# Patient Record
(demographics unavailable — no encounter records)

---

## 2024-10-27 NOTE — REVIEW OF SYSTEMS
[Cold Intolerance] : cold intolerance [Fatigue] : no fatigue [Decreased Appetite] : appetite not decreased [Recent Weight Gain (___ Lbs)] : no recent weight gain [Recent Weight Loss (___ Lbs)] : no recent weight loss [Fever] : no fever [Chills] : no chills [Dry Eyes] : no dryness [Blurred Vision] : no blurred vision [Eyes Itch] : no itch [Dysphagia] : no dysphagia [Hearing Loss] : no hearing loss  [Chest Pain] : no chest pain [Leg Claudication] : no leg claudication [Shortness Of Breath] : no shortness of breath [Cough] : no cough [Wheezing] : no wheezing [Nausea] : no nausea [Constipation] : no constipation [Heartburn] : no heartburn [Diarrhea] : no diarrhea [Polyuria] : no polyuria [Dysuria] : no dysuria [Nocturia] : no nocturia [Joint Pain] : no joint pain [Muscle Weakness] : no muscle weakness [Myalgia] : no myalgia  [Joint Stiffness] : no joint stiffness [Dry Skin] : no dry skin [Hair Loss] : no hair loss [Ulcer] : no ulcer [Headaches] : no headaches [Difficulty Walking] : no difficulty walking [Tremors] : no tremors [Pain/Numbness of Digits] : no pain/numbness of digits [Insomnia] : no insomnia [Anxiety] : no anxiety [Stress] : no stress [Polydipsia] : no polydipsia [Heat Intolerance] : no heat intolerance [Easy Bleeding] : no ~M tendency for easy bleeding [Easy Bruising] : no tendency for easy bruising [FreeTextEntry3] : See comments in the HPI [FreeTextEntry5] : Occasional edema at the end of the day.  Palpitations if she climbs stairs [FreeTextEntry6] : MOHAN only with stairs [FreeTextEntry9] : Mild pain in both knees, only with ambulation [de-identified] : Mild dysphoria related to her sister's death

## 2024-10-27 NOTE — REVIEW OF SYSTEMS
[Cold Intolerance] : cold intolerance [Fatigue] : no fatigue [Decreased Appetite] : appetite not decreased [Recent Weight Gain (___ Lbs)] : no recent weight gain [Recent Weight Loss (___ Lbs)] : no recent weight loss [Fever] : no fever [Chills] : no chills [Dry Eyes] : no dryness [Blurred Vision] : no blurred vision [Eyes Itch] : no itch [Dysphagia] : no dysphagia [Hearing Loss] : no hearing loss  [Chest Pain] : no chest pain [Leg Claudication] : no leg claudication [Shortness Of Breath] : no shortness of breath [Cough] : no cough [Wheezing] : no wheezing [Nausea] : no nausea [Constipation] : no constipation [Heartburn] : no heartburn [Diarrhea] : no diarrhea [Polyuria] : no polyuria [Dysuria] : no dysuria [Nocturia] : no nocturia [Joint Pain] : no joint pain [Muscle Weakness] : no muscle weakness [Myalgia] : no myalgia  [Joint Stiffness] : no joint stiffness [Dry Skin] : no dry skin [Hair Loss] : no hair loss [Ulcer] : no ulcer [Headaches] : no headaches [Difficulty Walking] : no difficulty walking [Tremors] : no tremors [Pain/Numbness of Digits] : no pain/numbness of digits [Insomnia] : no insomnia [Anxiety] : no anxiety [Stress] : no stress [Polydipsia] : no polydipsia [Heat Intolerance] : no heat intolerance [Easy Bleeding] : no ~M tendency for easy bleeding [Easy Bruising] : no tendency for easy bruising [FreeTextEntry3] : See comments in the HPI [FreeTextEntry5] : Occasional edema at the end of the day.  Palpitations if she climbs stairs [FreeTextEntry6] : MOHAN only with stairs [FreeTextEntry9] : Mild pain in both knees, only with ambulation [de-identified] : Mild dysphoria related to her sister's death

## 2024-10-27 NOTE — PHYSICAL EXAM
[Alert] : alert [Healthy Appearance] : healthy appearance [EOMI] : extra ocular movement intact [No Proptosis] : no proptosis [No Lid Lag] : no lid lag [Normal Outer Ear/Nose] : the ears and nose were normal in appearance [Normal Hearing] : hearing was normal [No Neck Mass] : no neck mass was observed [No LAD] : no lymphadenopathy [Clear to Auscultation] : lungs were clear to auscultation bilaterally [Normal Rate] : heart rate was normal [Regular Rhythm] : with a regular rhythm [Carotids Normal] : carotid pulses were normal with no bruits [Not Tender] : non-tender [Soft] : abdomen soft [No HSM] : no hepato-splenomegaly [Normal Supraclavicular Nodes] : no supraclavicular lymphadenopathy [Normal Anterior Cervical Nodes] : no anterior cervical lymphadenopathy [No CVA Tenderness] : no ~M costovertebral angle tenderness [Normal Gait] : normal gait [No Involuntary Movements] : no involuntary movements were seen [No Joint Swelling] : no joint swelling seen [Normal Strength/Tone] : muscle strength and tone were normal [No Rash] : no rash [Swelling] : swollen [Normal] : normal [0] : 0 in the posterior tibialis [2+] : 2+ in the dorsalis pedis [Vibration Dec.] : diminished vibratory sensation at the level of the toes [No Tremors] : no tremors [Normal Sensation on Monofilament Testing] : normal sensation on monofilament testing of lower extremities [Normal Affect] : the affect was normal [Normal Mood] : the mood was normal [No Murmurs] : no murmurs [Kyphosis] : no kyphosis present [Abdominal Striae] : no abdominal striae [Foot Ulcers] : no foot ulcers [Delayed in the Right Toes] : normal in the toes [Tenderness] : not tender [Erythema] : not erythematous [Delayed in the Left Toes] : normal in the toes [Position Sense Dec.] : normal position sense at the level of the toes [Diminished Throughout Both Feet] : normal tactile sensation with monofilament testing throughout both feet [de-identified] : Borderline obese [de-identified] : Opacified left cornea.  R pupil miotic.  Faint corneal arcus OD [de-identified] : Thyroid 30-40 grams, multinodular in consistency, with the most distinct nodule 3 cm in the R lobe  (Pt continues to refuse ultrasound) [de-identified] : 2+ non-pitting ankle edema bilaterally.  DP pulses 2+ bilaterally, PTs not palpable. [de-identified] : Areas of mild lipohypertrophy on either side of the umbilicus.  (Says that she has been avoiding injecting into these areas--now using other areas of the abdomen or her legs) [de-identified] : Faint acanthosis in the posterior cervical area [FreeTextEntry1] : Moderate ankle edema [FreeTextEntry3] : Ankel edema precludes optimal assessment of the PT pulses [FreeTextEntry5] : Moderate ankle edema [FreeTextEntry7] : Ankle edema interferes with optimal assessment of the PT pulse [de-identified] : Vibratory sensation moderately decreased over the toes [de-identified] : Was very "subdued" in demeanor, suspect that she was still dealing with her sister's recent death

## 2024-10-27 NOTE — PHYSICAL EXAM
[Alert] : alert [Healthy Appearance] : healthy appearance [EOMI] : extra ocular movement intact [No Proptosis] : no proptosis [No Lid Lag] : no lid lag [Normal Outer Ear/Nose] : the ears and nose were normal in appearance [Normal Hearing] : hearing was normal [No Neck Mass] : no neck mass was observed [No LAD] : no lymphadenopathy [Clear to Auscultation] : lungs were clear to auscultation bilaterally [Normal Rate] : heart rate was normal [Regular Rhythm] : with a regular rhythm [Carotids Normal] : carotid pulses were normal with no bruits [Not Tender] : non-tender [Soft] : abdomen soft [No HSM] : no hepato-splenomegaly [Normal Supraclavicular Nodes] : no supraclavicular lymphadenopathy [Normal Anterior Cervical Nodes] : no anterior cervical lymphadenopathy [No CVA Tenderness] : no ~M costovertebral angle tenderness [Normal Gait] : normal gait [No Involuntary Movements] : no involuntary movements were seen [No Joint Swelling] : no joint swelling seen [Normal Strength/Tone] : muscle strength and tone were normal [No Rash] : no rash [Swelling] : swollen [Normal] : normal [0] : 0 in the posterior tibialis [2+] : 2+ in the dorsalis pedis [Vibration Dec.] : diminished vibratory sensation at the level of the toes [No Tremors] : no tremors [Normal Sensation on Monofilament Testing] : normal sensation on monofilament testing of lower extremities [Normal Affect] : the affect was normal [Normal Mood] : the mood was normal [No Murmurs] : no murmurs [Kyphosis] : no kyphosis present [Abdominal Striae] : no abdominal striae [Foot Ulcers] : no foot ulcers [Delayed in the Right Toes] : normal in the toes [Tenderness] : not tender [Erythema] : not erythematous [Delayed in the Left Toes] : normal in the toes [Position Sense Dec.] : normal position sense at the level of the toes [Diminished Throughout Both Feet] : normal tactile sensation with monofilament testing throughout both feet [de-identified] : Opacified left cornea.  R pupil miotic.  Faint corneal arcus OD [de-identified] : Borderline obese [de-identified] : Thyroid 30-40 grams, multinodular in consistency, with the most distinct nodule 3 cm in the R lobe  (Pt continues to refuse ultrasound) [de-identified] : 2+ non-pitting ankle edema bilaterally.  DP pulses 2+ bilaterally, PTs not palpable. [de-identified] : Areas of mild lipohypertrophy on either side of the umbilicus.  (Says that she has been avoiding injecting into these areas--now using other areas of the abdomen or her legs) [de-identified] : Faint acanthosis in the posterior cervical area [FreeTextEntry1] : Moderate ankle edema [FreeTextEntry5] : Moderate ankle edema [FreeTextEntry3] : Ankel edema precludes optimal assessment of the PT pulses [FreeTextEntry7] : Ankle edema interferes with optimal assessment of the PT pulse [de-identified] : Vibratory sensation moderately decreased over the toes [de-identified] : Was very "subdued" in demeanor, suspect that she was still dealing with her sister's recent death

## 2024-10-27 NOTE — DATA REVIEWED
[FreeTextEntry1] : [facility]  (10/4/24)  ,  A1c 9.2% CMP WNL LDL 69, HDL 55, TG 62 Urine microalbumin ratio negative at 6.0 (normal < 30) TSH 1.10 uU/ml  (0.4-4.5) Hb 11.5 gm, MCV 86

## 2024-10-27 NOTE — HISTORY OF PRESENT ILLNESS
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  Her glycemic control has been inadequate for a number of years (A1c levels > 8%) despite basal/bolus insulin therapy.  The main barriers to improved control have been missed doses of insulin (less of a problem since she stopped working), diet lapses (primarily with excessive carbohydrate intake at her main meal) and insufficient post-prandial fingerstick monitoring to assess the adequacy of her pre-meal Novolog doses.  Despite the inadequate control, she has no history of nephropathy or significant retinopathy.  Has not had any acute illnesses since her last visit, and has no new physical complaints. Her sister with Alzheimers passed away in August Did not bring a log of her fingersticks, and did not check on coverage for a Dexcom or Nubia. Taking only 12 units of Levemir qhs  (did not increase as instructed, and is actually taking less than before) Premeal Novolog is 14 units at breakfast (did not increase as instructed)/24 at lunch/12 at supper --Says that her AM fingersticks are "100-110" --Breakfast is still two slices of toast.  Does not test after this meal or before lunch --Post-lunch readings are "180"  Starch  at lunch is one cup of rice/beans --Third meal in the evening is bread or half a hero roll --Fingersticks at bedtime are "140-150" --Says that she does not eat between meals BPs checked at home have been < 130/80. Sees her ophthalmologist every 3 months, mostly because of her increased IOP.  (Is now on Xalatan).  Exams have apparently been negative for retinopathy.

## 2024-10-27 NOTE — ASSESSMENT
[FreeTextEntry2] : Diet, post-prandial monitoring, CGM [FreeTextEntry1] : 1) type 2 DM:  Glycemic control remains extremely poor, even allowing for the likelihood that her A1c level may be artifactually elevated due to a chemically variant hemoglobin.  (Fructosamine levels in the past have suggested lower mean blood glucoses than the A1c).  The main barrier to improvement would still appear to be her lack of post-prandial monitoring and adjustment of her premeal insulin to bring the post-prandial values to below 150 mg%.  I suspect that her FS monitoring is minimal in frequency, perhaps due to discomfort from the fingersticks  --To increase the pre-lunch Novolog to 26 units, with the remainder of her regimen to remain the same. --Check a C-peptide level for possible addition of a GLP-1 agent --Again asked her to check on coverage for a CGM.   (Did not "push" her about the failure to investigate the CGM since she was almost certainly still dysphoric and dealing with her sister's death)  2) Hypercholesterolemia:  LDL-cholesterol level is below her target of 70 mg% --Continue atorvastatin 80 mg/day plus Zetia  3) Hypothyroidism: TSH level is in the optimal range of 0.4-2.5 uU/ml --Continue levothyroxine 112 mcg/day  4) Hypertension:  BP is excellent at today's visit, and her readings checked at home have been in target range --Continue combination therapy with lisinopril, amlodipine, HCTZ and metoprolol  5) Microalbuminuria:  Ratio has decreased into the normal range. --Continue lisinopril and strict BP control  See for follow-up in 3-4 months.  CMP, lipids, A1c, TFTs, microalb, CBC, Fe/IBC, C-peptide before the visit

## 2024-10-27 NOTE — DATA REVIEWED
[FreeTextEntry1] : [facility]  (10/4/24)  ,  A1c 9.2% CMP WNL LDL 69, HDL 55, TG 62 Urine microalbumin ratio negative at 6.0 (normal < 30) TSH 1.10 uU/ml  (0.4-4.5) Hb 11.5 gm, MCV 86

## 2025-03-09 NOTE — HISTORY OF PRESENT ILLNESS
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  Her glycemic control has been inadequate for a number of years (A1c levels > 8%) despite basal/bolus insulin therapy.  The main barriers to improved control have been missed doses of insulin (less of a problem since she stopped working), diet lapses (primarily with excessive carbohydrate intake at her main meal) and insufficient post-prandial fingerstick monitoring to assess the adequacy of her pre-meal Novolog doses.  Despite the inadequate control, she has no history of nephropathy or significant retinopathy.  .Has not had any acute illnesses or significant medical issues since her last visit. --Taking 12 units of Lantus qhs,  --Pre-breakfast fingersticks have been 100-120 mg%.  Has had a few late night insulin reactions, but these seem to have occurred close to MN, not in the middle of the night --Taking 12 units of Novolog before breakfast, which is still two slices of toast.  Has taken a few readings after breakfast, and these are usually 140-155.  (Takes the Novolog 30 min before she starts eating) --Main meal is 2-3 PM.  Protein is almost always chicken, and starch is 3/4 cup rice and beans.  Also has cooked (mixed) vegetables at the meal.  Has increased the Novolog to 24 units for this meal, but has not checked any post-prandial readings --Fingersticks before her third meal at 9 PM are "130-160."   Takes 12 units of Novolog for this meal, which is either two pieces of toast or 5-6 crackers.  Nearly all of the hypoglycemic reactions in the late evening seem to occur on the nights when she has the crackers. --Fruit intake seems to be limited to berries Ophth exam in January was negative BPs at home have been mostly 130-140/60-75 Denies any numbness of paresthesias in her feet Has not had any vaccines in the past 3-4 years       Has not had any acute illnesses since her last visit, and has no new physical complaints. Her sister with Alzheimers passed away in August Did not bring a log of her fingersticks, and did not check on coverage for a Dexcom or Nbuia. Taking only 12 units of Levemir qhs  (did not increase as instructed, and is actually taking less than before) Premeal Novolog is 14 units at breakfast (did not increase as instructed)/24 at lunch/12 at supper --Says that her AM fingersticks are "100-110" --Breakfast is still two slices of toast.  Does not test after this meal or before lunch --Post-lunch readings are "180"  Starch  at lunch is one cup of rice/beans --Third meal in the evening is bread or half a hero roll --Fingersticks at bedtime are "140-150" --Says that she does not eat between meals BPs checked at home have been < 130/80. Sees her ophthalmologist every 3 months, mostly because of her increased IOP.  (Is now on Xalatan).  Exams have apparently been negative for retinopathy.

## 2025-03-09 NOTE — PHYSICAL EXAM
[Alert] : alert [Healthy Appearance] : healthy appearance [EOMI] : extra ocular movement intact [No Proptosis] : no proptosis [No Lid Lag] : no lid lag [Normal Outer Ear/Nose] : the ears and nose were normal in appearance [Normal Hearing] : hearing was normal [No Neck Mass] : no neck mass was observed [No LAD] : no lymphadenopathy [Clear to Auscultation] : lungs were clear to auscultation bilaterally [No Murmurs] : no murmurs [Normal Rate] : heart rate was normal [Regular Rhythm] : with a regular rhythm [Carotids Normal] : carotid pulses were normal with no bruits [Not Tender] : non-tender [Soft] : abdomen soft [No HSM] : no hepato-splenomegaly [Normal Supraclavicular Nodes] : no supraclavicular lymphadenopathy [Normal Anterior Cervical Nodes] : no anterior cervical lymphadenopathy [No CVA Tenderness] : no ~M costovertebral angle tenderness [Normal Gait] : normal gait [No Involuntary Movements] : no involuntary movements were seen [No Joint Swelling] : no joint swelling seen [Normal Strength/Tone] : muscle strength and tone were normal [No Rash] : no rash [Swelling] : swollen [Normal] : normal [0] : 0 in the posterior tibialis [2+] : 2+ in the dorsalis pedis [Vibration Dec.] : diminished vibratory sensation at the level of the toes [No Tremors] : no tremors [Normal Sensation on Monofilament Testing] : normal sensation on monofilament testing of lower extremities [Normal Affect] : the affect was normal [Normal Mood] : the mood was normal [Kyphosis] : no kyphosis present [Abdominal Striae] : no abdominal striae [Foot Ulcers] : no foot ulcers [Delayed in the Right Toes] : normal in the toes [Tenderness] : not tender [Erythema] : not erythematous [Delayed in the Left Toes] : normal in the toes [Position Sense Dec.] : normal position sense at the level of the toes [Diminished Throughout Both Feet] : normal tactile sensation with monofilament testing throughout both feet [de-identified] : Borderline obese [de-identified] : Opacified left cornea.  R pupil miotic.  Faint corneal arcus OD [de-identified] : Thyroid 30-40 grams, multinodular in consistency, with the most distinct nodule 3 cm in the R lobe  (Pt continues to refuse ultrasound) [de-identified] : 2+ non-pitting ankle edema bilaterally.  DP pulses 2+ bilaterally, PTs not palpable. [de-identified] : Areas of mild lipohypertrophy on either side of the umbilicus.  (Says that she has been avoiding injecting into these areas--now using other areas of the abdomen or her legs) [de-identified] : Faint acanthosis in the posterior cervical area [FreeTextEntry1] : Moderate ankle edema [FreeTextEntry3] : Ankel edema precludes optimal assessment of the PT pulses [FreeTextEntry5] : Moderate ankle edema [FreeTextEntry7] : Ankle edema interferes with optimal assessment of the PT pulse [de-identified] : Vibratory sensation moderately decreased over the toes

## 2025-03-09 NOTE — HISTORY OF PRESENT ILLNESS
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  Her glycemic control has been inadequate for a number of years (A1c levels > 8%) despite basal/bolus insulin therapy.  The main barriers to improved control have been missed doses of insulin (less of a problem since she stopped working), diet lapses (primarily with excessive carbohydrate intake at her main meal) and insufficient post-prandial fingerstick monitoring to assess the adequacy of her pre-meal Novolog doses.  Despite the inadequate control, she has no history of nephropathy or significant retinopathy.  .Has not had any acute illnesses or significant medical issues since her last visit. --Taking 12 units of Lantus qhs,  --Pre-breakfast fingersticks have been 100-120 mg%.  Has had a few late night insulin reactions, but these seem to have occurred close to MN, not in the middle of the night --Taking 12 units of Novolog before breakfast, which is still two slices of toast.  Has taken a few readings after breakfast, and these are usually 140-155.  (Takes the Novolog 30 min before she starts eating) --Main meal is 2-3 PM.  Protein is almost always chicken, and starch is 3/4 cup rice and beans.  Also has cooked (mixed) vegetables at the meal.  Has increased the Novolog to 24 units for this meal, but has not checked any post-prandial readings --Fingersticks before her third meal at 9 PM are "130-160."   Takes 12 units of Novolog for this meal, which is either two pieces of toast or 5-6 crackers.  Nearly all of the hypoglycemic reactions in the late evening seem to occur on the nights when she has the crackers. --Fruit intake seems to be limited to berries Ophth exam in January was negative BPs at home have been mostly 130-140/60-75 Denies any numbness of paresthesias in her feet Has not had any vaccines in the past 3-4 years       Has not had any acute illnesses since her last visit, and has no new physical complaints. Her sister with Alzheimers passed away in August Did not bring a log of her fingersticks, and did not check on coverage for a Dexcom or Nubia. Taking only 12 units of Levemir qhs  (did not increase as instructed, and is actually taking less than before) Premeal Novolog is 14 units at breakfast (did not increase as instructed)/24 at lunch/12 at supper --Says that her AM fingersticks are "100-110" --Breakfast is still two slices of toast.  Does not test after this meal or before lunch --Post-lunch readings are "180"  Starch  at lunch is one cup of rice/beans --Third meal in the evening is bread or half a hero roll --Fingersticks at bedtime are "140-150" --Says that she does not eat between meals BPs checked at home have been < 130/80. Sees her ophthalmologist every 3 months, mostly because of her increased IOP.  (Is now on Xalatan).  Exams have apparently been negative for retinopathy.

## 2025-03-09 NOTE — REVIEW OF SYSTEMS
[Cold Intolerance] : cold intolerance [Fatigue] : no fatigue [Decreased Appetite] : appetite not decreased [Recent Weight Gain (___ Lbs)] : no recent weight gain [Recent Weight Loss (___ Lbs)] : no recent weight loss [Fever] : no fever [Chills] : no chills [Dry Eyes] : no dryness [Blurred Vision] : no blurred vision [Eyes Itch] : no itch [Dysphagia] : no dysphagia [Hearing Loss] : no hearing loss  [Chest Pain] : no chest pain [Leg Claudication] : no leg claudication [Shortness Of Breath] : no shortness of breath [Cough] : no cough [Wheezing] : no wheezing [Nausea] : no nausea [Constipation] : no constipation [Heartburn] : no heartburn [Diarrhea] : no diarrhea [Polyuria] : no polyuria [Dysuria] : no dysuria [Nocturia] : no nocturia [Joint Pain] : no joint pain [Muscle Weakness] : no muscle weakness [Myalgia] : no myalgia  [Joint Stiffness] : no joint stiffness [Dry Skin] : no dry skin [Hair Loss] : no hair loss [Ulcer] : no ulcer [Headaches] : no headaches [Difficulty Walking] : no difficulty walking [Tremors] : no tremors [Pain/Numbness of Digits] : no pain/numbness of digits [Insomnia] : no insomnia [Anxiety] : no anxiety [Stress] : no stress [Polydipsia] : no polydipsia [Heat Intolerance] : no heat intolerance [Easy Bleeding] : no ~M tendency for easy bleeding [Easy Bruising] : no tendency for easy bruising [FreeTextEntry5] : Occasional edema at the end of the day.  Palpitations when she climbs stairs [FreeTextEntry6] : MOHAN only with stairs [FreeTextEntry9] : Mild pain in both knees, R >> L, mostly with stairs

## 2025-03-09 NOTE — ASSESSMENT
[FreeTextEntry1] : 1) Type 2 DM: --Continue Lantus 12 units qhs --Decrease the Novolog to 6-8 units for her third meal when having crackers --Clearly needs a CGM to better assess her post-prandial glucoses.  I also need the data in order to determine when she is hyperglycemic, but also whether the mean blood glucose on the CGM confirms the suspicion that her A1c level is falsely high.  2) Hypercholesterolemia: LDL-cholesterol is below her target of 70 mg%. --Continue atorvastatin 80 mg?day plus Zetia  3) Hypothyroidism:  TSH level is in the optimal range of 0.4-2.5 uU/ml  ---Continue levothyroxine 112 mcg/day  4) Hypertension:

## 2025-03-09 NOTE — PHYSICAL EXAM
[Alert] : alert [Healthy Appearance] : healthy appearance [EOMI] : extra ocular movement intact [No Proptosis] : no proptosis [No Lid Lag] : no lid lag [Normal Outer Ear/Nose] : the ears and nose were normal in appearance [Normal Hearing] : hearing was normal [No Neck Mass] : no neck mass was observed [No LAD] : no lymphadenopathy [Clear to Auscultation] : lungs were clear to auscultation bilaterally [No Murmurs] : no murmurs [Normal Rate] : heart rate was normal [Regular Rhythm] : with a regular rhythm [Carotids Normal] : carotid pulses were normal with no bruits [Not Tender] : non-tender [Soft] : abdomen soft [No HSM] : no hepato-splenomegaly [Normal Supraclavicular Nodes] : no supraclavicular lymphadenopathy [Normal Anterior Cervical Nodes] : no anterior cervical lymphadenopathy [No CVA Tenderness] : no ~M costovertebral angle tenderness [Normal Gait] : normal gait [No Involuntary Movements] : no involuntary movements were seen [No Joint Swelling] : no joint swelling seen [Normal Strength/Tone] : muscle strength and tone were normal [No Rash] : no rash [Swelling] : swollen [Normal] : normal [0] : 0 in the posterior tibialis [2+] : 2+ in the dorsalis pedis [Vibration Dec.] : diminished vibratory sensation at the level of the toes [No Tremors] : no tremors [Normal Sensation on Monofilament Testing] : normal sensation on monofilament testing of lower extremities [Normal Affect] : the affect was normal [Normal Mood] : the mood was normal [Kyphosis] : no kyphosis present [Abdominal Striae] : no abdominal striae [Foot Ulcers] : no foot ulcers [Delayed in the Right Toes] : normal in the toes [Tenderness] : not tender [Erythema] : not erythematous [Delayed in the Left Toes] : normal in the toes [Position Sense Dec.] : normal position sense at the level of the toes [Diminished Throughout Both Feet] : normal tactile sensation with monofilament testing throughout both feet [de-identified] : Borderline obese [de-identified] : Opacified left cornea.  R pupil miotic.  Faint corneal arcus OD [de-identified] : Thyroid 30-40 grams, multinodular in consistency, with the most distinct nodule 3 cm in the R lobe  (Pt continues to refuse ultrasound) [de-identified] : 2+ non-pitting ankle edema bilaterally.  DP pulses 2+ bilaterally, PTs not palpable. [de-identified] : Areas of mild lipohypertrophy on either side of the umbilicus.  (Says that she has been avoiding injecting into these areas--now using other areas of the abdomen or her legs) [de-identified] : Faint acanthosis in the posterior cervical area [FreeTextEntry1] : Moderate ankle edema [FreeTextEntry3] : Ankel edema precludes optimal assessment of the PT pulses [FreeTextEntry5] : Moderate ankle edema [FreeTextEntry7] : Ankle edema interferes with optimal assessment of the PT pulse [de-identified] : Vibratory sensation moderately decreased over the toes

## 2025-07-02 NOTE — PHYSICAL EXAM
[Alert] : alert [Healthy Appearance] : healthy appearance [EOMI] : extra ocular movement intact [No Proptosis] : no proptosis [No Lid Lag] : no lid lag [Normal Outer Ear/Nose] : the ears and nose were normal in appearance [Normal Hearing] : hearing was normal [No Neck Mass] : no neck mass was observed [No LAD] : no lymphadenopathy [Clear to Auscultation] : lungs were clear to auscultation bilaterally [No Murmurs] : no murmurs [Normal Rate] : heart rate was normal [Regular Rhythm] : with a regular rhythm [Carotids Normal] : carotid pulses were normal with no bruits [Not Tender] : non-tender [Soft] : abdomen soft [No HSM] : no hepato-splenomegaly [Normal Supraclavicular Nodes] : no supraclavicular lymphadenopathy [Normal Anterior Cervical Nodes] : no anterior cervical lymphadenopathy [No CVA Tenderness] : no ~M costovertebral angle tenderness [Normal Gait] : normal gait [No Involuntary Movements] : no involuntary movements were seen [No Joint Swelling] : no joint swelling seen [Normal Strength/Tone] : muscle strength and tone were normal [No Rash] : no rash [Swelling] : swollen [Normal] : normal [0] : 0 in the posterior tibialis [2+] : 2+ in the dorsalis pedis [Vibration Dec.] : diminished vibratory sensation at the level of the toes [No Tremors] : no tremors [Normal Sensation on Monofilament Testing] : normal sensation on monofilament testing of lower extremities [Normal Affect] : the affect was normal [Normal Mood] : the mood was normal [Kyphosis] : no kyphosis present [Abdominal Striae] : no abdominal striae [Foot Ulcers] : no foot ulcers [Delayed in the Right Toes] : normal in the toes [Tenderness] : not tender [Erythema] : not erythematous [Delayed in the Left Toes] : normal in the toes [Position Sense Dec.] : normal position sense at the level of the toes [Diminished Throughout Both Feet] : normal tactile sensation with monofilament testing throughout both feet [de-identified] : Borderline obese [de-identified] : Opacified left cornea.  R pupil miotic.  Faint corneal arcus OD [de-identified] : Thyroid 30-40 grams, multinodular in consistency, with the most distinct nodule 3 cm in the R lobe  (Pt continues to refuse ultrasound) [de-identified] : 2+ non-pitting ankle edema bilaterally.  DP pulses 2+ bilaterally, PTs not palpable. [de-identified] : Areas of mild lipohypertrophy on either side of the umbilicus.  (Says that she has been avoiding injecting into these areas--now using mostly her anterior thighs, and there is no lipohypertrophy in those areas [de-identified] : Faint acanthosis in the posterior cervical area [FreeTextEntry1] : Moderate ankle edema [FreeTextEntry3] : Ankel edema precludes optimal assessment of the PT pulses [FreeTextEntry5] : Moderate ankle edema [FreeTextEntry7] : Ankle edema interferes with optimal assessment of the PT pulse [de-identified] : Vibratory sensation moderately decreased over the toes

## 2025-07-02 NOTE — HISTORY OF PRESENT ILLNESS
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  Her glycemic control has been inadequate for a number of years (A1c levels > 8%) despite basal/bolus insulin therapy.  The main barriers to improved control have been missed doses of insulin (less of a problem since she stopped working), diet lapses (primarily with excessive carbohydrate intake at her main meal) and insufficient post-prandial fingerstick monitoring to assess the adequacy of her pre-meal Novolog doses.  Despite the inadequate control, she has no history of nephropathy or significant retinopathy.  Has not had any acute illnesses since her last visit, and has no new physical complaints Says that she contacted [employer] about coverage for a CGM, but "they never called me back" --Taking 12 units Lantus qhs, says that AM fingersticks have bee "100-120" with no overnight hypoglycemia --Breakfast is still usually two slices of toast, but occasionally only 4 crackers.  Takes 12 units Novolog for both of these options, but does not check a 2-hr post-prandial glucose.  She also denies having any hypoglycemia when she takes the full dose for only the crackers --She now admits to having fruit between breakfast and lunch, but this can be both an apple and a small mandarin, and her glucoses before lunch will often be up to "180" --Taking 28 units of Novolog before lunch, which is the main meal.  Starch is one cup of rice and beans.  The occasional fingersticks which she has taken after the meal have been "160-170" --Third meal in the evening is often oatmeal.  Takes 12 units Novolog for this, but does not check a FS at bedtime Takes her pre-meal Novolog 15 min before she starts eating  BPs checked at home have been < 135/80 Sees her ophthalmologist every 4 months.  Says that exams have been negative for retinopathy Denies any neuropathic symptoms in her feet Has not been doing any exercise walks       .Has not had any acute illnesses or significant medical issues since her last visit. --Taking 12 units of Lantus qhs,  --Pre-breakfast fingersticks have been 100-120 mg%.  Has had a few late night insulin reactions, but these seem to have occurred close to MN, not in the middle of the night --Taking 12 units of Novolog before breakfast, which is still two slices of toast.  Has taken a few readings after breakfast, and these are usually 140-155.  (Takes the Novolog 30 min before she starts eating) --Main meal is 2-3 PM.  Protein is almost always chicken, and starch is 3/4 cup rice and beans.  Also has cooked (mixed) vegetables at the meal.  Has increased the Novolog to 24 units for this meal, but has not checked any post-prandial readings --Fingersticks before her third meal at 9 PM are "130-160."   Takes 12 units of Novolog for this meal, which is either two pieces of toast or 5-6 crackers.  Nearly all of the hypoglycemic reactions in the late evening seem to occur on the nights when she has the crackers. --Fruit intake seems to be limited to berries Ophth exam in January was negative BPs at home have been mostly 130-140/60-75 Denies any numbness of paresthesias in her feet Has not had any vaccines in the past 3-4 years       Has not had any acute illnesses since her last visit, and has no new physical complaints. Her sister with Alzheimers passed away in August Did not bring a log of her fingersticks, and did not check on coverage for a Dexcom or Nubia. Taking only 12 units of Levemir qhs  (did not increase as instructed, and is actually taking less than before) Premeal Novolog is 14 units at breakfast (did not increase as instructed)/24 at lunch/12 at supper --Says that her AM fingersticks are "100-110" --Breakfast is still two slices of toast.  Does not test after this meal or before lunch --Post-lunch readings are "180"  Starch  at lunch is one cup of rice/beans --Third meal in the evening is bread or half a hero roll --Fingersticks at bedtime are "140-150" --Says that she does not eat between meals BPs checked at home have been < 130/80. Sees her ophthalmologist every 3 months, mostly because of her increased IOP.  (Is now on Xalatan).  Exams have apparently been negative for retinopathy.

## 2025-07-02 NOTE — REVIEW OF SYSTEMS
[Cold Intolerance] : cold intolerance [Fatigue] : no fatigue [Decreased Appetite] : appetite not decreased [Fever] : no fever [Chills] : no chills [Dry Eyes] : no dryness [Blurred Vision] : no blurred vision [Eyes Itch] : no itch [Dysphagia] : no dysphagia [Hearing Loss] : no hearing loss  [Chest Pain] : no chest pain [Leg Claudication] : no leg claudication [Shortness Of Breath] : no shortness of breath [Cough] : no cough [Wheezing] : no wheezing [Nausea] : no nausea [Constipation] : no constipation [Heartburn] : no heartburn [Diarrhea] : no diarrhea [Polyuria] : no polyuria [Dysuria] : no dysuria [Nocturia] : no nocturia [Joint Pain] : no joint pain [Muscle Weakness] : no muscle weakness [Myalgia] : no myalgia  [Joint Stiffness] : no joint stiffness [Dry Skin] : no dry skin [Hair Loss] : no hair loss [Ulcer] : no ulcer [Headaches] : no headaches [Difficulty Walking] : no difficulty walking [Tremors] : no tremors [Pain/Numbness of Digits] : no pain/numbness of digits [Insomnia] : no insomnia [Anxiety] : no anxiety [Stress] : no stress [Polydipsia] : no polydipsia [Heat Intolerance] : no heat intolerance [Easy Bleeding] : no ~M tendency for easy bleeding [Easy Bruising] : no tendency for easy bruising [FreeTextEntry2] : Has gained 3 lb since her last visit [FreeTextEntry5] : Occasional edema at the end of the day, is gone by the next morning..  Palpitations when she climbs stairs [FreeTextEntry6] : MOHAN only with stairs [FreeTextEntry9] : Mild pain in both knees, R >> L, mostly with stairs

## 2025-07-02 NOTE — ASSESSMENT
[FreeTextEntry1] : Add back ezetimibe decrease atorva to 40 mg (1/2 tgab)  Leave message re: local pharmacy for Dexcom